# Patient Record
Sex: FEMALE | Race: OTHER | HISPANIC OR LATINO | URBAN - METROPOLITAN AREA
[De-identification: names, ages, dates, MRNs, and addresses within clinical notes are randomized per-mention and may not be internally consistent; named-entity substitution may affect disease eponyms.]

---

## 2019-03-21 ENCOUNTER — EMERGENCY (EMERGENCY)
Facility: HOSPITAL | Age: 44
LOS: 0 days | Discharge: HOME | End: 2019-03-21
Admitting: PHYSICIAN ASSISTANT

## 2019-03-21 VITALS
TEMPERATURE: 97 F | HEART RATE: 92 BPM | RESPIRATION RATE: 18 BRPM | OXYGEN SATURATION: 100 % | DIASTOLIC BLOOD PRESSURE: 66 MMHG | SYSTOLIC BLOOD PRESSURE: 116 MMHG

## 2019-03-21 DIAGNOSIS — N61.1 ABSCESS OF THE BREAST AND NIPPLE: ICD-10-CM

## 2019-03-21 DIAGNOSIS — N64.4 MASTODYNIA: ICD-10-CM

## 2019-03-21 DIAGNOSIS — N61.0 MASTITIS WITHOUT ABSCESS: ICD-10-CM

## 2019-03-21 DIAGNOSIS — Z79.2 LONG TERM (CURRENT) USE OF ANTIBIOTICS: ICD-10-CM

## 2019-03-21 NOTE — ED PROVIDER NOTE - SKIN, MLM
Breast Exam chaperoned by NATANAEL Aguilar ; Left Breast: 2 x 2 cm superficial area of fluctuance along 12 o clock region of breast abutting areola with some surrounding erythema ; no warmth ; no nipple involvement ; no nipple discharge ; remainder of visualized skin normal color for race, warm, dry and intact.

## 2019-03-21 NOTE — ED PROVIDER NOTE - NSFOLLOWUPINSTRUCTIONS_ED_ALL_ED_FT
Follow up in Breast Clinic ; Return to the ED in 1-2 days for a wound check  See information sheet(s) for further discharge instructions

## 2019-03-21 NOTE — ED PROVIDER NOTE - OBJECTIVE STATEMENT
42 y/o F, no significant PMHx, presents to the ED with complaints of left breast pain x three days. Patient admits to having redness along left breast with small area of swelling. She admits to a hx of prior similar symptoms approximately two months ago for which she sought tx at [facility]. She states that she was placed on abx with resolution of symptoms. She denies any nipple drainage, fever, chills, chest pain, dyspnea nausea and vomiting.

## 2019-03-21 NOTE — ED ADULT NURSE NOTE - CHIEF COMPLAINT QUOTE
as per son, "shes been having problems on her breast lately, she has pain and feels bumps" x2 months. Pt was seen at [facility] and told she had an infection and took a course of abx

## 2019-03-21 NOTE — ED PROVIDER NOTE - HEME/LYMPH NEGATIVE STATEMENT, MLM
[facility] [employer] of Child Health Examination       Student's Name  Jarrod Mcqueen D Date     Signature                                                                                                                                              Title                           Date    (If adding dates to the above immu ALLERGIES  (Food, drug, insect, other)  Patient has no known allergies. MEDICATION  (List all prescribed or taken on a regular basis.)     Diagnosis of asthma?   Child wakes during the night coughing   Yes   No    Yes   No    Loss of function of one of pair Family History   NO    Ethnic Minority  YES          Signs of Insulin Resistance (hypertension, dyslipidemia, polycystic ovarian syndrome, acanthosis nigricans)    NO           At Risk  NO   Lead Risk Questionnaire  Req'd for children 6 months thru 6 yrs e NEEDS/MODIFICATIONS required in the school setting  NONE DIETARY Needs/Restrictions     NONE   SPECIAL INSTRUCTIONS/DEVICES e.g. safety glasses, glass eye, chest protector for arrhythmia, pacemaker, prosthetic device, dental bridge, false teeth, athleticsu no anemia, no easy bruising, no jaundice, no swollen lymph nodes.

## 2019-03-21 NOTE — ED ADULT TRIAGE NOTE - CHIEF COMPLAINT QUOTE
as per son, "shes been having problems on her breast lately, she has pain and feels bumps" x2 months. Pt was seen at [facility] and told she had an infection and took a course of abx

## 2019-03-21 NOTE — ED PROVIDER NOTE - NSFOLLOWUPCLINICS_GEN_ALL_ED_FT
[facility]  Breast  [address]  [place] [address]  Phone: [phone]  Fax:   Follow Up Time:

## 2019-03-21 NOTE — ED PROVIDER NOTE - CLINICAL SUMMARY MEDICAL DECISION MAKING FREE TEXT BOX
Patient with superficial breast abscess - small incision made with expression of discharge. Patient prescribed abx and advised to return for a wound check in 1-2 days. I have discussed the discharge plan with the patient. The patient agrees with the plan, as discussed.  The patient understands Emergency Department diagnosis is a preliminary diagnosis often based on limited information and that the patient must adhere to the follow-up plan as discussed.  The patient understands that if the symptoms worsen or if prescribed medications do not have the desired/planned effect that the patient may return to the Emergency Department at any time for further evaluation and treatment.

## 2019-03-21 NOTE — ED PROVIDER NOTE - PROGRESS NOTE DETAILS
Patient offered translation services multiple times however she refused and requested that her son translate

## 2022-05-18 ENCOUNTER — OUTPATIENT (OUTPATIENT)
Dept: OUTPATIENT SERVICES | Facility: HOSPITAL | Age: 47
LOS: 1 days | Discharge: HOME | End: 2022-05-18

## 2022-05-18 DIAGNOSIS — Z20.828 CONTACT WITH AND (SUSPECTED) EXPOSURE TO OTHER VIRAL COMMUNICABLE DISEASES: ICD-10-CM

## 2024-04-19 ENCOUNTER — EMERGENCY (EMERGENCY)
Facility: HOSPITAL | Age: 49
LOS: 0 days | Discharge: HOME | End: 2024-04-20
Attending: EMERGENCY MEDICINE
Payer: SELF-PAY

## 2024-04-19 VITALS
HEART RATE: 102 BPM | DIASTOLIC BLOOD PRESSURE: 80 MMHG | TEMPERATURE: 99 F | SYSTOLIC BLOOD PRESSURE: 146 MMHG | HEIGHT: 56 IN | RESPIRATION RATE: 24 BRPM | OXYGEN SATURATION: 98 % | WEIGHT: 142.2 LBS

## 2024-04-19 DIAGNOSIS — Z20.822 CONTACT WITH AND (SUSPECTED) EXPOSURE TO COVID-19: ICD-10-CM

## 2024-04-19 DIAGNOSIS — R06.2 WHEEZING: ICD-10-CM

## 2024-04-19 DIAGNOSIS — R05.9 COUGH, UNSPECIFIED: ICD-10-CM

## 2024-04-19 DIAGNOSIS — R06.02 SHORTNESS OF BREATH: ICD-10-CM

## 2024-04-19 DIAGNOSIS — R91.8 OTHER NONSPECIFIC ABNORMAL FINDING OF LUNG FIELD: ICD-10-CM

## 2024-04-19 PROCEDURE — 85025 COMPLETE CBC W/AUTO DIFF WBC: CPT

## 2024-04-19 PROCEDURE — 71046 X-RAY EXAM CHEST 2 VIEWS: CPT

## 2024-04-19 PROCEDURE — 84703 CHORIONIC GONADOTROPIN ASSAY: CPT

## 2024-04-19 PROCEDURE — 71275 CT ANGIOGRAPHY CHEST: CPT | Mod: MC

## 2024-04-19 PROCEDURE — 36415 COLL VENOUS BLD VENIPUNCTURE: CPT

## 2024-04-19 PROCEDURE — 99285 EMERGENCY DEPT VISIT HI MDM: CPT

## 2024-04-19 PROCEDURE — 80053 COMPREHEN METABOLIC PANEL: CPT

## 2024-04-19 PROCEDURE — 99285 EMERGENCY DEPT VISIT HI MDM: CPT | Mod: 25

## 2024-04-19 PROCEDURE — 93010 ELECTROCARDIOGRAM REPORT: CPT

## 2024-04-19 PROCEDURE — 93005 ELECTROCARDIOGRAM TRACING: CPT

## 2024-04-19 PROCEDURE — 99053 MED SERV 10PM-8AM 24 HR FAC: CPT

## 2024-04-19 PROCEDURE — 85379 FIBRIN DEGRADATION QUANT: CPT

## 2024-04-19 PROCEDURE — 84484 ASSAY OF TROPONIN QUANT: CPT

## 2024-04-19 PROCEDURE — 96374 THER/PROPH/DIAG INJ IV PUSH: CPT | Mod: XU

## 2024-04-19 PROCEDURE — 83735 ASSAY OF MAGNESIUM: CPT

## 2024-04-19 PROCEDURE — 94640 AIRWAY INHALATION TREATMENT: CPT

## 2024-04-19 PROCEDURE — 0241U: CPT

## 2024-04-19 PROCEDURE — 83880 ASSAY OF NATRIURETIC PEPTIDE: CPT

## 2024-04-19 NOTE — ED ADULT TRIAGE NOTE - CHIEF COMPLAINT QUOTE
I got a lot of cough and I can't breathe for two weeks - patient  Patient denies fever, denies any sick contacts at home, has some chest discomfort when coughing, denies PMH

## 2024-04-20 VITALS — HEART RATE: 89 BPM | OXYGEN SATURATION: 97 % | RESPIRATION RATE: 20 BRPM

## 2024-04-20 LAB
ALBUMIN SERPL ELPH-MCNC: 4.4 G/DL — SIGNIFICANT CHANGE UP (ref 3.5–5.2)
ALP SERPL-CCNC: 90 U/L — SIGNIFICANT CHANGE UP (ref 30–115)
ALT FLD-CCNC: 17 U/L — SIGNIFICANT CHANGE UP (ref 0–41)
ANION GAP SERPL CALC-SCNC: 13 MMOL/L — SIGNIFICANT CHANGE UP (ref 7–14)
AST SERPL-CCNC: 23 U/L — SIGNIFICANT CHANGE UP (ref 0–41)
BASOPHILS # BLD AUTO: 0.07 K/UL — SIGNIFICANT CHANGE UP (ref 0–0.2)
BASOPHILS NFR BLD AUTO: 0.7 % — SIGNIFICANT CHANGE UP (ref 0–1)
BILIRUB SERPL-MCNC: 0.3 MG/DL — SIGNIFICANT CHANGE UP (ref 0.2–1.2)
BUN SERPL-MCNC: 13 MG/DL — SIGNIFICANT CHANGE UP (ref 10–20)
CALCIUM SERPL-MCNC: 9.1 MG/DL — SIGNIFICANT CHANGE UP (ref 8.4–10.5)
CHLORIDE SERPL-SCNC: 103 MMOL/L — SIGNIFICANT CHANGE UP (ref 98–110)
CO2 SERPL-SCNC: 21 MMOL/L — SIGNIFICANT CHANGE UP (ref 17–32)
CREAT SERPL-MCNC: 0.8 MG/DL — SIGNIFICANT CHANGE UP (ref 0.7–1.5)
D DIMER BLD IA.RAPID-MCNC: 195 NG/ML DDU — SIGNIFICANT CHANGE UP
EGFR: 92 ML/MIN/1.73M2 — SIGNIFICANT CHANGE UP
EOSINOPHIL # BLD AUTO: 1.16 K/UL — HIGH (ref 0–0.7)
EOSINOPHIL NFR BLD AUTO: 11.3 % — HIGH (ref 0–8)
FLUAV AG NPH QL: SIGNIFICANT CHANGE UP
FLUBV AG NPH QL: SIGNIFICANT CHANGE UP
GLUCOSE SERPL-MCNC: 111 MG/DL — HIGH (ref 70–99)
HCG SERPL QL: NEGATIVE — SIGNIFICANT CHANGE UP
HCT VFR BLD CALC: 36.7 % — LOW (ref 37–47)
HGB BLD-MCNC: 12.4 G/DL — SIGNIFICANT CHANGE UP (ref 12–16)
IMM GRANULOCYTES NFR BLD AUTO: 0.4 % — HIGH (ref 0.1–0.3)
LYMPHOCYTES # BLD AUTO: 2.74 K/UL — SIGNIFICANT CHANGE UP (ref 1.2–3.4)
LYMPHOCYTES # BLD AUTO: 26.8 % — SIGNIFICANT CHANGE UP (ref 20.5–51.1)
MAGNESIUM SERPL-MCNC: 2.1 MG/DL — SIGNIFICANT CHANGE UP (ref 1.8–2.4)
MCHC RBC-ENTMCNC: 28.2 PG — SIGNIFICANT CHANGE UP (ref 27–31)
MCHC RBC-ENTMCNC: 33.8 G/DL — SIGNIFICANT CHANGE UP (ref 32–37)
MCV RBC AUTO: 83.6 FL — SIGNIFICANT CHANGE UP (ref 81–99)
MONOCYTES # BLD AUTO: 0.97 K/UL — HIGH (ref 0.1–0.6)
MONOCYTES NFR BLD AUTO: 9.5 % — HIGH (ref 1.7–9.3)
NEUTROPHILS # BLD AUTO: 5.26 K/UL — SIGNIFICANT CHANGE UP (ref 1.4–6.5)
NEUTROPHILS NFR BLD AUTO: 51.3 % — SIGNIFICANT CHANGE UP (ref 42.2–75.2)
NRBC # BLD: 0 /100 WBCS — SIGNIFICANT CHANGE UP (ref 0–0)
NT-PROBNP SERPL-SCNC: <5 PG/ML — SIGNIFICANT CHANGE UP (ref 0–300)
PLATELET # BLD AUTO: 458 K/UL — HIGH (ref 130–400)
PMV BLD: 9.8 FL — SIGNIFICANT CHANGE UP (ref 7.4–10.4)
POTASSIUM SERPL-MCNC: 4 MMOL/L — SIGNIFICANT CHANGE UP (ref 3.5–5)
POTASSIUM SERPL-SCNC: 4 MMOL/L — SIGNIFICANT CHANGE UP (ref 3.5–5)
PROT SERPL-MCNC: 7.4 G/DL — SIGNIFICANT CHANGE UP (ref 6–8)
RBC # BLD: 4.39 M/UL — SIGNIFICANT CHANGE UP (ref 4.2–5.4)
RBC # FLD: 13.7 % — SIGNIFICANT CHANGE UP (ref 11.5–14.5)
RSV RNA NPH QL NAA+NON-PROBE: SIGNIFICANT CHANGE UP
SARS-COV-2 RNA SPEC QL NAA+PROBE: SIGNIFICANT CHANGE UP
SODIUM SERPL-SCNC: 137 MMOL/L — SIGNIFICANT CHANGE UP (ref 135–146)
TROPONIN T, HIGH SENSITIVITY RESULT: <6 NG/L — SIGNIFICANT CHANGE UP (ref 6–13)
WBC # BLD: 10.24 K/UL — SIGNIFICANT CHANGE UP (ref 4.8–10.8)
WBC # FLD AUTO: 10.24 K/UL — SIGNIFICANT CHANGE UP (ref 4.8–10.8)

## 2024-04-20 PROCEDURE — 71046 X-RAY EXAM CHEST 2 VIEWS: CPT | Mod: 26

## 2024-04-20 PROCEDURE — 71275 CT ANGIOGRAPHY CHEST: CPT | Mod: 26,MC

## 2024-04-20 RX ORDER — IPRATROPIUM/ALBUTEROL SULFATE 18-103MCG
3 AEROSOL WITH ADAPTER (GRAM) INHALATION ONCE
Refills: 0 | Status: COMPLETED | OUTPATIENT
Start: 2024-04-20 | End: 2024-04-20

## 2024-04-20 RX ORDER — ALBUTEROL 90 UG/1
1 AEROSOL, METERED ORAL ONCE
Refills: 0 | Status: COMPLETED | OUTPATIENT
Start: 2024-04-20 | End: 2024-04-20

## 2024-04-20 RX ADMIN — Medication 125 MILLIGRAM(S): at 01:07

## 2024-04-20 RX ADMIN — ALBUTEROL 1 PUFF(S): 90 AEROSOL, METERED ORAL at 06:41

## 2024-04-20 RX ADMIN — Medication 3 MILLILITER(S): at 01:06

## 2024-04-20 RX ADMIN — Medication 100 MILLIGRAM(S): at 06:39

## 2024-04-20 NOTE — ED PROVIDER NOTE - CARE PROVIDER_API CALL
Gabriel Reynoso  Critical Care Medicine  [address] [place] [id number]  Phone: [phone]  Fax: [phone]  Follow Up Time: 7-10 Days

## 2024-04-20 NOTE — ED ADULT NURSE NOTE - OBJECTIVE STATEMENT
Pt is c/o difficulties breathing, with cold symptoms for few days now, denies fever, denies any sick contacts at home, has some chest discomfort when coughing, denies PMH

## 2024-04-20 NOTE — ED PROVIDER NOTE - CLINICAL SUMMARY MEDICAL DECISION MAKING FREE TEXT BOX
Patient presented for evaluation of cough and, wheezing associated shortness of breath for the past year intermittently.  On exam patient with expiratory wheezes.  Required EKG, labs, imaging and labs.  CT of the chest done and showed bilateral upper lobe ground-glass opacities.  I spoke with radiologist regarding the likelihood of tuberculosis and states this i is not radiographically suspected.  Will treat patient with antibiotics and steroid and give patient outpatient follow-up with pulmonary.  Discussed with patient return precautions.

## 2024-04-20 NOTE — ED PROVIDER NOTE - PHYSICAL EXAMINATION
CONSTITUTIONAL: Well-developed; well-nourished; in no acute distress, nontoxic appearing  SKIN: skin exam is warm and dry,  HEAD: Normocephalic; atraumatic.  EYES: PERRL, 3 mm bilateral, no nystagmus, EOM intact; conjunctiva and sclera clear.  ENT: MMM, no nasal congestion  NECK: Supple; non tender.+ full passive ROM in all directions. No JVD  CARD: S1, S2 normal, no murmur  RESP: + expiratory wheezes, Good air movement bilaterally, normal work of breathing  ABD: soft; non-distended; non-tender. No Rebound, No guarding  EXT: Normal ROM. No cyanosis or edema. Dp Pulses intact.   NEURO: awake, alert, following commands, oriented, grossly unremarkable. No Focal deficits. GCS 15.   PSYCH: Cooperative, appropriate.

## 2024-04-20 NOTE — ED PROVIDER NOTE - OBJECTIVE STATEMENT
48-year-old female otherwise healthy, non-smoker presents for evaluation of over 1 year of intermittent episodes of coughing and associated wheezing and shortness of breath.  Patient denies any chest pain, no LOC.  Patient states that she has been to the urgent care and was treated with antibiotics and steroids and her symptoms are improving every time.  Denies fever, chest pain, abdominal or urinary complaints.

## 2024-04-20 NOTE — ED PROVIDER NOTE - PATIENT PORTAL LINK FT
You can access the FollowMyHealth Patient Portal offered by [facility] by registering at the following website: [email or web address]. By joining [employer]’s FollowMyHealth portal, you will also be able to view your health information using other applications (apps) compatible with our system.

## 2024-04-20 NOTE — ED PROVIDER NOTE - NSFOLLOWUPINSTRUCTIONS_ED_ALL_ED_FT
Follow up with the pulmonologist within 1 week    Nuestros coordinadores de referencias del departamento de emergencias se comunicarán con usted en las próximas 24 a  48 horas de 9:00 a. m. a 5:00 p. m. (de lunes a viernes) con frank radha de seguimiento. Espere frank llamada telefónica del hospital en femi período de tiempo. Si no recibe frank llamada o si tiene alguna pregunta o inquietud, puede comunicarse con ellos al [phone].    Disnea en los adultos  Shortness of Breath, Adult  Frank persona tiene disnea cuando tiene dificultad para respirar o cuando siente que tiene problemas para inhalar suficiente aire. La disnea puede ser un signo de un problema médico.    Siga estas indicaciones en martinez casa:  A sign showing that a person should not smoke.  Sustancias contaminantes    No consuma ningún producto que contenga nicotina o tabaco. Estos productos incluyen cigarrillos, tabaco para mascar y aparatos de vapeo, alessandra los cigarrillos electrónicos. [place] también incluye cigarros y pipas. Si necesita ayuda para dejar de consumir estos productos, consulte al médico.  Evite cosas que pueden irritar las vías respiratorias, entre ellas:  Humo. [place] incluye el humo de las fogatas, el humo de los incendios forestales y el humo ambiental de los productos que contienen tabaco. No fume ni permita que otras personas fumen en martinez casa.  Moho.  Polvo.  Contaminación del aire.  Vapores de productos químicos.  Cosas que le pueden producir frank reacción alérgica (alérgenos) si tiene alergias. Los alérgenos frecuentes incluyen el polen de pasto o árboles y la caspa de los animales.  Mantenga martinez casa limpia y sin moho ni polvo.  Indicaciones generales    Esté atento a cualquier cambio en los síntomas.  Use los medicamentos de venta raghav y los recetados solamente alessandra se lo haya indicado el médico. [place] incluye la oxigenoterapia y los medicamentos inhalados.  Descanse todo lo que sea necesario.  Retome brandon actividades normales según lo indicado por el médico. Pregúntele al médico qué actividades son seguras para usted.  Concurra a todas las visitas de seguimiento. [place] es importante.  Comuníquese con un médico si:  Martinez afección no mejora tan pronto alessandra se espera.  Le nasreen hacer las actividades cotidianas, incluso después de descansar.  Aparecen nuevos síntomas.  No puede subir escaleras o realizar ejercicio del modo en que lo hacía habitualmente.  Solicite ayuda de inmediato si:  La disnea empeora.  Tiene dificultad para respirar cuando está en reposo.  Se siente mareado o se desmaya.  Tiene tos que no puede controlar con la medicación.  Tose y escupe kylee.  Siente dolor al respirar.  Tiene dolor en el pecho, los brazos, los hombros o el abdomen.  Tiene fiebre.  Estos síntomas pueden indicar frank emergencia. Solicite ayuda de inmediato. Llame al 911.  No espere a nani si los síntomas desaparecen.  No conduzca por brandon propios medios hasta el hospital.  Resumen  Frank persona tiene disnea cuando tiene dificultad para inhalar la cantidad suficiente de aire. Puede ser signo de un problema médico.  Evite las cosas que irritan los pulmones, alessandra el hábito de fumar, la contaminación, el moho y el polvo.  Preste atención a los cambios en los síntomas y comuníquese con el médico si le resulta difícil realizar brandon actividades cotidianas debido a la disnea.  Esta información no tiene alessandra fin reemplazar el consejo del médico. Asegúrese de hacerle al médico cualquier pregunta que tenga.

## 2024-06-03 ENCOUNTER — EMERGENCY (EMERGENCY)
Facility: HOSPITAL | Age: 49
LOS: 0 days | Discharge: HOME | End: 2024-06-03
Attending: EMERGENCY MEDICINE
Payer: COMMERCIAL

## 2024-06-03 VITALS
DIASTOLIC BLOOD PRESSURE: 63 MMHG | WEIGHT: 160.06 LBS | TEMPERATURE: 98 F | RESPIRATION RATE: 18 BRPM | HEIGHT: 56 IN | SYSTOLIC BLOOD PRESSURE: 112 MMHG | OXYGEN SATURATION: 100 % | HEART RATE: 92 BPM

## 2024-06-03 DIAGNOSIS — R10.13 EPIGASTRIC PAIN: ICD-10-CM

## 2024-06-03 DIAGNOSIS — R53.1 WEAKNESS: ICD-10-CM

## 2024-06-03 DIAGNOSIS — E86.0 DEHYDRATION: ICD-10-CM

## 2024-06-03 DIAGNOSIS — R11.0 NAUSEA: ICD-10-CM

## 2024-06-03 DIAGNOSIS — R19.7 DIARRHEA, UNSPECIFIED: ICD-10-CM

## 2024-06-03 DIAGNOSIS — R53.83 OTHER FATIGUE: ICD-10-CM

## 2024-06-03 DIAGNOSIS — E73.9 LACTOSE INTOLERANCE, UNSPECIFIED: ICD-10-CM

## 2024-06-03 DIAGNOSIS — Z98.891 HISTORY OF UTERINE SCAR FROM PREVIOUS SURGERY: ICD-10-CM

## 2024-06-03 LAB
ALBUMIN SERPL ELPH-MCNC: 4.1 G/DL — SIGNIFICANT CHANGE UP (ref 3.5–5.2)
ALP SERPL-CCNC: 85 U/L — SIGNIFICANT CHANGE UP (ref 30–115)
ALT FLD-CCNC: 13 U/L — SIGNIFICANT CHANGE UP (ref 0–41)
ANION GAP SERPL CALC-SCNC: 12 MMOL/L — SIGNIFICANT CHANGE UP (ref 7–14)
AST SERPL-CCNC: 17 U/L — SIGNIFICANT CHANGE UP (ref 0–41)
BASOPHILS # BLD AUTO: 0.03 K/UL — SIGNIFICANT CHANGE UP (ref 0–0.2)
BASOPHILS NFR BLD AUTO: 0.3 % — SIGNIFICANT CHANGE UP (ref 0–1)
BILIRUB SERPL-MCNC: <0.2 MG/DL — SIGNIFICANT CHANGE UP (ref 0.2–1.2)
BUN SERPL-MCNC: 14 MG/DL — SIGNIFICANT CHANGE UP (ref 10–20)
CALCIUM SERPL-MCNC: 8.8 MG/DL — SIGNIFICANT CHANGE UP (ref 8.4–10.5)
CHLORIDE SERPL-SCNC: 106 MMOL/L — SIGNIFICANT CHANGE UP (ref 98–110)
CO2 SERPL-SCNC: 21 MMOL/L — SIGNIFICANT CHANGE UP (ref 17–32)
CREAT SERPL-MCNC: 0.7 MG/DL — SIGNIFICANT CHANGE UP (ref 0.7–1.5)
EGFR: 108 ML/MIN/1.73M2 — SIGNIFICANT CHANGE UP
EOSINOPHIL # BLD AUTO: 0.83 K/UL — HIGH (ref 0–0.7)
EOSINOPHIL NFR BLD AUTO: 7.5 % — SIGNIFICANT CHANGE UP (ref 0–8)
GLUCOSE SERPL-MCNC: 123 MG/DL — HIGH (ref 70–99)
HCG SERPL QL: NEGATIVE — SIGNIFICANT CHANGE UP
HCT VFR BLD CALC: 36.1 % — LOW (ref 37–47)
HGB BLD-MCNC: 11.9 G/DL — LOW (ref 12–16)
IMM GRANULOCYTES NFR BLD AUTO: 0.4 % — HIGH (ref 0.1–0.3)
LYMPHOCYTES # BLD AUTO: 19.5 % — LOW (ref 20.5–51.1)
LYMPHOCYTES # BLD AUTO: 2.15 K/UL — SIGNIFICANT CHANGE UP (ref 1.2–3.4)
MCHC RBC-ENTMCNC: 28.7 PG — SIGNIFICANT CHANGE UP (ref 27–31)
MCHC RBC-ENTMCNC: 33 G/DL — SIGNIFICANT CHANGE UP (ref 32–37)
MCV RBC AUTO: 87.2 FL — SIGNIFICANT CHANGE UP (ref 81–99)
MONOCYTES # BLD AUTO: 0.55 K/UL — SIGNIFICANT CHANGE UP (ref 0.1–0.6)
MONOCYTES NFR BLD AUTO: 5 % — SIGNIFICANT CHANGE UP (ref 1.7–9.3)
NEUTROPHILS # BLD AUTO: 7.43 K/UL — HIGH (ref 1.4–6.5)
NEUTROPHILS NFR BLD AUTO: 67.3 % — SIGNIFICANT CHANGE UP (ref 42.2–75.2)
NRBC # BLD: 0 /100 WBCS — SIGNIFICANT CHANGE UP (ref 0–0)
PLATELET # BLD AUTO: 368 K/UL — SIGNIFICANT CHANGE UP (ref 130–400)
PMV BLD: 10.4 FL — SIGNIFICANT CHANGE UP (ref 7.4–10.4)
POTASSIUM SERPL-MCNC: 4.2 MMOL/L — SIGNIFICANT CHANGE UP (ref 3.5–5)
POTASSIUM SERPL-SCNC: 4.2 MMOL/L — SIGNIFICANT CHANGE UP (ref 3.5–5)
PROT SERPL-MCNC: 6.2 G/DL — SIGNIFICANT CHANGE UP (ref 6–8)
RBC # BLD: 4.14 M/UL — LOW (ref 4.2–5.4)
RBC # FLD: 14.5 % — SIGNIFICANT CHANGE UP (ref 11.5–14.5)
SODIUM SERPL-SCNC: 139 MMOL/L — SIGNIFICANT CHANGE UP (ref 135–146)
WBC # BLD: 11.03 K/UL — HIGH (ref 4.8–10.8)
WBC # FLD AUTO: 11.03 K/UL — HIGH (ref 4.8–10.8)

## 2024-06-03 PROCEDURE — 96374 THER/PROPH/DIAG INJ IV PUSH: CPT

## 2024-06-03 PROCEDURE — 93005 ELECTROCARDIOGRAM TRACING: CPT

## 2024-06-03 PROCEDURE — 36415 COLL VENOUS BLD VENIPUNCTURE: CPT

## 2024-06-03 PROCEDURE — 80053 COMPREHEN METABOLIC PANEL: CPT

## 2024-06-03 PROCEDURE — 84703 CHORIONIC GONADOTROPIN ASSAY: CPT

## 2024-06-03 PROCEDURE — 85025 COMPLETE CBC W/AUTO DIFF WBC: CPT

## 2024-06-03 PROCEDURE — 99284 EMERGENCY DEPT VISIT MOD MDM: CPT

## 2024-06-03 PROCEDURE — 93010 ELECTROCARDIOGRAM REPORT: CPT

## 2024-06-03 PROCEDURE — T1013: CPT

## 2024-06-03 PROCEDURE — 99284 EMERGENCY DEPT VISIT MOD MDM: CPT | Mod: 25

## 2024-06-03 RX ORDER — SODIUM CHLORIDE 9 MG/ML
2000 INJECTION INTRAMUSCULAR; INTRAVENOUS; SUBCUTANEOUS ONCE
Refills: 0 | Status: COMPLETED | OUTPATIENT
Start: 2024-06-03 | End: 2024-06-03

## 2024-06-03 RX ORDER — FAMOTIDINE 10 MG/ML
20 INJECTION INTRAVENOUS ONCE
Refills: 0 | Status: COMPLETED | OUTPATIENT
Start: 2024-06-03 | End: 2024-06-03

## 2024-06-03 RX ADMIN — FAMOTIDINE 20 MILLIGRAM(S): 10 INJECTION INTRAVENOUS at 21:20

## 2024-06-03 RX ADMIN — SODIUM CHLORIDE 2000 MILLILITER(S): 9 INJECTION INTRAMUSCULAR; INTRAVENOUS; SUBCUTANEOUS at 20:10

## 2024-06-03 NOTE — ED ADULT NURSE NOTE - CHIEF COMPLAINT QUOTE
pt c/o abdominal pain, nausea and diarrhea that began when she woke up this morning. pt also c/o weakness, dizziness and diaphoresis

## 2024-06-03 NOTE — ED ADULT TRIAGE NOTE - CHIEF COMPLAINT QUOTE
pt c/o abdominal pain, nausea and diarrhea that began when she woke up this morning. pt also c/o weakness, dizziness and diaphoresis Bipolar disorder

## 2024-06-03 NOTE — ED PROVIDER NOTE - PATIENT PORTAL LINK FT
You can access the FollowMyHealth Patient Portal offered by [facility] by registering at the following website: [email or web address]. By joining [employer]’s FollowMyHealth portal, you will also be able to view your health information using other applications (apps) compatible with our system.

## 2024-06-03 NOTE — ED PROVIDER NOTE - ATTENDING APP SHARED VISIT CONTRIBUTION OF CARE
49 yo F Citizen of the [place] speaking patient with PMH of  x 1, lactose intolerance, presents to the ER for diarrhea x few (watery, nonbloody) since this am. Pt denies any vomiting but had nausea and epigastric discomfort this am which is now resolved. Pt states she ate some iced sorbet (nondairy) last night, but nothing else that could explain the diarrhea or upset stomach. No sick contacts or travel. Pt states at this point the nausea/diarrhea and epigastric discomfort all resolved but she feels a lot of fatigue and generalized weakness. No fever/chills/rash/HA/SOB/CP/ complaints/dizziness/neck pain/leg swelling or calf tenderness. Not eating much today but drinking slowly from a water bottle with no vomiting    AVSS pt in NAD, NCAT, PERRL, EOMI, Lungs: CTAB, Heart: Reg S1S2, Abdomen: soft nt/nd +BS no mass, Ext: no edema, no calf tenderness, normal distal pulses.     A/P Nontender abdomen now. States she had large volume of diarrhea which can sometimes cause low potassium. Will check labs, EKG, IVFs and reassess.     ALL: nkda  Meds: denies  SH no smoking or etoh  LMP 1 week ago, irregular

## 2024-06-03 NOTE — ED PROVIDER NOTE - NSFOLLOWUPINSTRUCTIONS_ED_ALL_ED_FT
Dehydration    WHAT YOU NEED TO KNOW:    Dehydration is a condition that develops when your body does not have enough fluid. You may become dehydrated if you do not drink enough water or lose too much fluid. Fluid loss may also cause loss of electrolytes (minerals), such as sodium.    DISCHARGE INSTRUCTIONS:    Return to the emergency department if:     You have a seizure.      You are confused or cannot think clearly.      You are extremely sleepy, or another person cannot wake you.       You become dizzy or faint when you stand.      You are not able to urinate.      You have trouble breathing.      You have a fast or irregular heartbeat.      Your hands or feet are cold, or your face is pale.     Contact your healthcare provider if:     You have trouble drinking liquids because you are vomiting.      Your symptoms get worse.      You have a fever.       You feel very weak or tired.      You have questions or concerns about your condition or care.    Follow up with your healthcare provider as directed: Write down your questions so you remember to ask them during your visits.     Prevent or manage dehydration:     Drink liquids as directed. Liquids that contain water, sugar, and minerals can help your body hold in fluid and help prevent dehydration. Drink liquids throughout the day, not just when you feel thirsty. Men should drink about 3 liters (13 eight-ounce cups) of liquid each day. Women should drink about 2 liters (9 eight-ounce cups) of liquid each day. Drink even more liquid if you will be outdoors, in the sun for a long time, or exercising.       Stay cool. Limit the time you spend outdoors during the hottest part of the day. Dress in lightweight clothes.       Keep track of how often you urinate. If you urinate less than usual or your urine is darker, drink more liquids.         © Copyright [employer] 2019 All illustrations and images included in CareNotes are the copyrighted property of [employer] or [employer].

## 2024-06-03 NOTE — ED PROVIDER NOTE - OBJECTIVE STATEMENT
48-year-old female Bangladeshi-speaking with no significant past medical history presents to the ED with generalized weakness.   ID [id number] used to obtain history.  Patient states that she woke up this morning with multiple episodes of large-volume watery diarrhea.  She also reported epigastric pain earlier in the day associated with nausea and 1 episode of clear emesis. At this time her abdominal pain, nausea, vomiting and diarrhea have resolved however she reports generalized weakness and fatigue. Denies headache, dizziness, paresthesias, fever, chills, chest pain, shortness of breath, constipation, dysuria, hematuria, lower extremity swelling, rash.

## 2024-06-03 NOTE — ED PROVIDER NOTE - CLINICAL SUMMARY MEDICAL DECISION MAKING FREE TEXT BOX
49 yo F Brazilian speaking patient with PMH of  x 1, lactose intolerance, presents to the ER for diarrhea x few (watery, nonbloody) since this am. Pt denies any vomiting but had nausea and epigastric discomfort this am which is now resolved. Pt states she ate some iced sorbet (nondairy) last night, but nothing else that could explain the diarrhea or upset stomach. No sick contacts or travel. Pt states at this point the nausea/diarrhea and epigastric discomfort all resolved but she feels a lot of fatigue and generalized weakness. No fever/chills/rash/HA/SOB/CP/ complaints/dizziness/neck pain/leg swelling or calf tenderness. Not eating much today but drinking slowly from a water bottle with no vomiting    Reviewed all test results . Pt given all results. Results explained to pt. Pt has serial abdominal exams, nontender. Tolerated po challenge in the ER. NO diarrhea in the ER. To dc as pt feels better. Recommend f/u with PMD. Return to ER for any worsening of symptoms

## 2024-06-03 NOTE — ED ADULT TRIAGE NOTE - NS ED TRIAGE AVPU SCALE
-- DO NOT REPLY / DO NOT REPLY ALL --  -- Message is from [employer] Center [employer] ([employer]) --    General Patient Message: Please reach out to Elly from [employer] at [phone] regarding the prrogress notes from patient's last visit.      Caller Information       Type Contact Phone/Fax    08/16/2023 10:39 AM CDT Phone (Outgoing) Francois Rocha (Self) [phone] (M)    Left Message     08/18/2023 09:54 AM CDT Phone (Incoming) Elly/[employer] [phone]        Alternative phone number: None    Can a detailed message be left? Yes    Message Turnaround:     Is it Working Hours? Yes - Working Hours     [place]:    Please give this turnaround time to the caller:   \"This message will be sent to [state Provider's name]. The clinical team will fulfill your request as soon as they review your message.\"                 Alert-The patient is alert, awake and responds to voice. The patient is oriented to time, place, and person. The triage nurse is able to obtain subjective information.

## 2024-08-31 ENCOUNTER — EMERGENCY (EMERGENCY)
Facility: HOSPITAL | Age: 49
LOS: 0 days | Discharge: HOME | End: 2024-08-31
Attending: EMERGENCY MEDICINE
Payer: COMMERCIAL

## 2024-08-31 VITALS
TEMPERATURE: 98 F | RESPIRATION RATE: 18 BRPM | SYSTOLIC BLOOD PRESSURE: 118 MMHG | HEIGHT: 58 IN | DIASTOLIC BLOOD PRESSURE: 74 MMHG | WEIGHT: 135.8 LBS | HEART RATE: 92 BPM | OXYGEN SATURATION: 98 %

## 2024-08-31 DIAGNOSIS — R05.1 ACUTE COUGH: ICD-10-CM

## 2024-08-31 DIAGNOSIS — R06.2 WHEEZING: ICD-10-CM

## 2024-08-31 DIAGNOSIS — R09.81 NASAL CONGESTION: ICD-10-CM

## 2024-08-31 DIAGNOSIS — J45.909 UNSPECIFIED ASTHMA, UNCOMPLICATED: ICD-10-CM

## 2024-08-31 PROCEDURE — 99284 EMERGENCY DEPT VISIT MOD MDM: CPT

## 2024-08-31 PROCEDURE — 99285 EMERGENCY DEPT VISIT HI MDM: CPT | Mod: 25

## 2024-08-31 PROCEDURE — 94640 AIRWAY INHALATION TREATMENT: CPT

## 2024-08-31 PROCEDURE — 71046 X-RAY EXAM CHEST 2 VIEWS: CPT

## 2024-08-31 PROCEDURE — 71046 X-RAY EXAM CHEST 2 VIEWS: CPT | Mod: 26

## 2024-08-31 RX ORDER — PREDNISONE 20 MG/1
60 TABLET ORAL ONCE
Refills: 0 | Status: COMPLETED | OUTPATIENT
Start: 2024-08-31 | End: 2024-08-31

## 2024-08-31 RX ORDER — IPRATROPIUM BROMIDE AND ALBUTEROL SULFATE .5; 2.5 MG/3ML; MG/3ML
3 SOLUTION RESPIRATORY (INHALATION)
Refills: 0 | Status: COMPLETED | OUTPATIENT
Start: 2024-08-31 | End: 2024-08-31

## 2024-08-31 RX ORDER — AZITHROMYCIN 250 MG
1 CAPSULE ORAL
Qty: 6 | Refills: 0
Start: 2024-08-31 | End: 2024-09-04

## 2024-08-31 RX ORDER — PREDNISONE 20 MG/1
1 TABLET ORAL
Qty: 5 | Refills: 0
Start: 2024-08-31 | End: 2024-09-04

## 2024-08-31 RX ADMIN — IPRATROPIUM BROMIDE AND ALBUTEROL SULFATE 3 MILLILITER(S): .5; 2.5 SOLUTION RESPIRATORY (INHALATION) at 15:48

## 2024-08-31 RX ADMIN — IPRATROPIUM BROMIDE AND ALBUTEROL SULFATE 3 MILLILITER(S): .5; 2.5 SOLUTION RESPIRATORY (INHALATION) at 15:28

## 2024-08-31 RX ADMIN — PREDNISONE 60 MILLIGRAM(S): 20 TABLET ORAL at 15:26

## 2024-08-31 RX ADMIN — IPRATROPIUM BROMIDE AND ALBUTEROL SULFATE 3 MILLILITER(S): .5; 2.5 SOLUTION RESPIRATORY (INHALATION) at 16:08

## 2024-09-19 PROBLEM — Z00.00 ENCOUNTER FOR PREVENTIVE HEALTH EXAMINATION: Status: ACTIVE | Noted: 2024-09-19

## 2024-10-17 ENCOUNTER — EMERGENCY (EMERGENCY)
Facility: HOSPITAL | Age: 49
LOS: 0 days | Discharge: HOME | End: 2024-10-18
Attending: EMERGENCY MEDICINE
Payer: SELF-PAY

## 2024-10-17 VITALS
TEMPERATURE: 98 F | OXYGEN SATURATION: 96 % | RESPIRATION RATE: 20 BRPM | SYSTOLIC BLOOD PRESSURE: 114 MMHG | HEART RATE: 106 BPM | HEIGHT: 58 IN | DIASTOLIC BLOOD PRESSURE: 76 MMHG

## 2024-10-17 DIAGNOSIS — R07.89 OTHER CHEST PAIN: ICD-10-CM

## 2024-10-17 DIAGNOSIS — J40 BRONCHITIS, NOT SPECIFIED AS ACUTE OR CHRONIC: ICD-10-CM

## 2024-10-17 DIAGNOSIS — R05.8 OTHER SPECIFIED COUGH: ICD-10-CM

## 2024-10-17 DIAGNOSIS — J18.9 PNEUMONIA, UNSPECIFIED ORGANISM: ICD-10-CM

## 2024-10-17 DIAGNOSIS — R06.02 SHORTNESS OF BREATH: ICD-10-CM

## 2024-10-17 LAB
ALBUMIN SERPL ELPH-MCNC: 4.4 G/DL — SIGNIFICANT CHANGE UP (ref 3.5–5.2)
ALP SERPL-CCNC: 81 U/L — SIGNIFICANT CHANGE UP (ref 30–115)
ALT FLD-CCNC: 17 U/L — SIGNIFICANT CHANGE UP (ref 0–41)
ANION GAP SERPL CALC-SCNC: 12 MMOL/L — SIGNIFICANT CHANGE UP (ref 7–14)
AST SERPL-CCNC: 26 U/L — SIGNIFICANT CHANGE UP (ref 0–41)
BASOPHILS # BLD AUTO: 0.06 K/UL — SIGNIFICANT CHANGE UP (ref 0–0.2)
BASOPHILS NFR BLD AUTO: 0.7 % — SIGNIFICANT CHANGE UP (ref 0–1)
BILIRUB SERPL-MCNC: 0.2 MG/DL — SIGNIFICANT CHANGE UP (ref 0.2–1.2)
BUN SERPL-MCNC: 7 MG/DL — LOW (ref 10–20)
CALCIUM SERPL-MCNC: 9.4 MG/DL — SIGNIFICANT CHANGE UP (ref 8.4–10.5)
CHLORIDE SERPL-SCNC: 100 MMOL/L — SIGNIFICANT CHANGE UP (ref 98–110)
CO2 SERPL-SCNC: 25 MMOL/L — SIGNIFICANT CHANGE UP (ref 17–32)
CREAT SERPL-MCNC: 0.6 MG/DL — LOW (ref 0.7–1.5)
EGFR: 111 ML/MIN/1.73M2 — SIGNIFICANT CHANGE UP
EOSINOPHIL # BLD AUTO: 1.4 K/UL — HIGH (ref 0–0.7)
EOSINOPHIL NFR BLD AUTO: 16.5 % — HIGH (ref 0–8)
GLUCOSE SERPL-MCNC: 90 MG/DL — SIGNIFICANT CHANGE UP (ref 70–99)
HCG SERPL QL: NEGATIVE — SIGNIFICANT CHANGE UP
HCT VFR BLD CALC: 37.4 % — SIGNIFICANT CHANGE UP (ref 37–47)
HGB BLD-MCNC: 12.6 G/DL — SIGNIFICANT CHANGE UP (ref 12–16)
IMM GRANULOCYTES NFR BLD AUTO: 0.4 % — HIGH (ref 0.1–0.3)
LYMPHOCYTES # BLD AUTO: 2.03 K/UL — SIGNIFICANT CHANGE UP (ref 1.2–3.4)
LYMPHOCYTES # BLD AUTO: 24 % — SIGNIFICANT CHANGE UP (ref 20.5–51.1)
MCHC RBC-ENTMCNC: 29.2 PG — SIGNIFICANT CHANGE UP (ref 27–31)
MCHC RBC-ENTMCNC: 33.7 G/DL — SIGNIFICANT CHANGE UP (ref 32–37)
MCV RBC AUTO: 86.6 FL — SIGNIFICANT CHANGE UP (ref 81–99)
MONOCYTES # BLD AUTO: 0.68 K/UL — HIGH (ref 0.1–0.6)
MONOCYTES NFR BLD AUTO: 8 % — SIGNIFICANT CHANGE UP (ref 1.7–9.3)
NEUTROPHILS # BLD AUTO: 4.27 K/UL — SIGNIFICANT CHANGE UP (ref 1.4–6.5)
NEUTROPHILS NFR BLD AUTO: 50.4 % — SIGNIFICANT CHANGE UP (ref 42.2–75.2)
NRBC # BLD: 0 /100 WBCS — SIGNIFICANT CHANGE UP (ref 0–0)
PLATELET # BLD AUTO: 417 K/UL — HIGH (ref 130–400)
PMV BLD: 9.8 FL — SIGNIFICANT CHANGE UP (ref 7.4–10.4)
POTASSIUM SERPL-MCNC: 4.4 MMOL/L — SIGNIFICANT CHANGE UP (ref 3.5–5)
POTASSIUM SERPL-SCNC: 4.4 MMOL/L — SIGNIFICANT CHANGE UP (ref 3.5–5)
PROT SERPL-MCNC: 6.8 G/DL — SIGNIFICANT CHANGE UP (ref 6–8)
RBC # BLD: 4.32 M/UL — SIGNIFICANT CHANGE UP (ref 4.2–5.4)
RBC # FLD: 13.1 % — SIGNIFICANT CHANGE UP (ref 11.5–14.5)
SODIUM SERPL-SCNC: 137 MMOL/L — SIGNIFICANT CHANGE UP (ref 135–146)
WBC # BLD: 8.47 K/UL — SIGNIFICANT CHANGE UP (ref 4.8–10.8)
WBC # FLD AUTO: 8.47 K/UL — SIGNIFICANT CHANGE UP (ref 4.8–10.8)

## 2024-10-17 PROCEDURE — 71250 CT THORAX DX C-: CPT | Mod: MC

## 2024-10-17 PROCEDURE — 99285 EMERGENCY DEPT VISIT HI MDM: CPT | Mod: 25

## 2024-10-17 PROCEDURE — 80053 COMPREHEN METABOLIC PANEL: CPT

## 2024-10-17 PROCEDURE — 93010 ELECTROCARDIOGRAM REPORT: CPT

## 2024-10-17 PROCEDURE — 71046 X-RAY EXAM CHEST 2 VIEWS: CPT

## 2024-10-17 PROCEDURE — 93005 ELECTROCARDIOGRAM TRACING: CPT

## 2024-10-17 PROCEDURE — 94640 AIRWAY INHALATION TREATMENT: CPT

## 2024-10-17 PROCEDURE — 96374 THER/PROPH/DIAG INJ IV PUSH: CPT

## 2024-10-17 PROCEDURE — 71250 CT THORAX DX C-: CPT | Mod: 26,MC

## 2024-10-17 PROCEDURE — 93010 ELECTROCARDIOGRAM REPORT: CPT | Mod: 77

## 2024-10-17 PROCEDURE — 36415 COLL VENOUS BLD VENIPUNCTURE: CPT

## 2024-10-17 PROCEDURE — 71046 X-RAY EXAM CHEST 2 VIEWS: CPT | Mod: 26

## 2024-10-17 PROCEDURE — 84703 CHORIONIC GONADOTROPIN ASSAY: CPT

## 2024-10-17 PROCEDURE — 85025 COMPLETE CBC W/AUTO DIFF WBC: CPT

## 2024-10-17 PROCEDURE — 99285 EMERGENCY DEPT VISIT HI MDM: CPT

## 2024-10-17 RX ORDER — MAGNESIUM SULFATE 500 MG/ML
2 VIAL (ML) INJECTION ONCE
Refills: 0 | Status: COMPLETED | OUTPATIENT
Start: 2024-10-17 | End: 2024-10-17

## 2024-10-17 RX ORDER — ALBUTEROL 90 MCG
2 AEROSOL (GRAM) INHALATION EVERY 6 HOURS
Refills: 0 | Status: DISCONTINUED | OUTPATIENT
Start: 2024-10-17 | End: 2024-10-18

## 2024-10-17 RX ORDER — PREDNISONE 5 MG/1
1 TABLET ORAL
Qty: 4 | Refills: 0
Start: 2024-10-17 | End: 2024-10-20

## 2024-10-17 RX ORDER — IPRATROPIUM BROMIDE AND ALBUTEROL SULFATE .5; 3 MG/3ML; MG/3ML
3 SOLUTION RESPIRATORY (INHALATION)
Refills: 0 | Status: COMPLETED | OUTPATIENT
Start: 2024-10-17 | End: 2024-10-17

## 2024-10-17 RX ADMIN — IPRATROPIUM BROMIDE AND ALBUTEROL SULFATE 3 MILLILITER(S): .5; 3 SOLUTION RESPIRATORY (INHALATION) at 18:55

## 2024-10-17 RX ADMIN — IPRATROPIUM BROMIDE AND ALBUTEROL SULFATE 3 MILLILITER(S): .5; 3 SOLUTION RESPIRATORY (INHALATION) at 19:20

## 2024-10-17 RX ADMIN — Medication 150 GRAM(S): at 22:45

## 2024-10-17 RX ADMIN — Medication 2 PUFF(S): at 23:27

## 2024-10-17 RX ADMIN — IPRATROPIUM BROMIDE AND ALBUTEROL SULFATE 3 MILLILITER(S): .5; 3 SOLUTION RESPIRATORY (INHALATION) at 19:44

## 2024-10-17 RX ADMIN — IPRATROPIUM BROMIDE AND ALBUTEROL SULFATE 3 MILLILITER(S): .5; 3 SOLUTION RESPIRATORY (INHALATION) at 22:45

## 2024-10-17 RX ADMIN — IPRATROPIUM BROMIDE AND ALBUTEROL SULFATE 3 MILLILITER(S): .5; 3 SOLUTION RESPIRATORY (INHALATION) at 23:24

## 2024-10-17 RX ADMIN — Medication 10 MILLIGRAM(S): at 18:55

## 2024-10-17 NOTE — ED PROVIDER NOTE - PATIENT PORTAL LINK FT
You can access the FollowMyHealth Patient Portal offered by [facility] by registering at the following website: [email or web address]. By joining [employer]’s FollowMyHealth portal, you will also be able to view your health information using other applications (apps) compatible with our system.

## 2024-10-17 NOTE — ED PROVIDER NOTE - OBJECTIVE STATEMENT
49-year-old female no reported past medical history presents to the ED for evaluation of cough.  Patient with cough x 3 weeks, minimally productive, progressively worsening with associated shortness of breath and chest tightness when coughing.  Positive contacts at home son with viral illness.  Has used her son's nebulizer with interval improvement.  Denies any fevers, chills, nausea, vomiting, diarrhea, hemoptysis, leg swelling, typical chest pain, history of clots in legs along, hormone use, recent surgeries, recent travel

## 2024-10-17 NOTE — ED PROVIDER NOTE - NSPTACCESSSVCSAPPTDETAILS_ED_ALL_ED_FT
Patient with chronic cough for 3 weeks and wheezing, given steroids and albuterol inhaler.  Does not have PCP please help establish care.  Patient also needs routine follow-up with pulmonology to establish questionable diagnosis of asthma Patient with chronic cough for 3 weeks and wheezing,  questionable pneumonia on CT scan given steroids and albuterol inhaler.  Does not have PCP please help establish care . Patient also needs routine follow-up with pulmonology to establish questionable diagnosis of asthma

## 2024-10-17 NOTE — ED PROVIDER NOTE - PHYSICAL EXAMINATION
CONST: Well appearing in NAD  EYES: PERRL, EOMI, Sclera and conjunctiva clear.   ENT: Oropharynx normal appearing, no erythema or exudates. Uvula midline.  CARD: Normal S1 S2; Normal rate and rhythm  RESP: Diffuse wheezing in all lung fields.  No respiratory distress talking full sentences.  GI: Soft, non-tender, non-distended.  MS: Normal ROM in all extremities. No midline spinal tenderness.  SKIN: Warm, dry, no acute rashes.   NEURO: A&Ox3, No focal deficits. Strength 5/5 with no sensory deficits. Steady gait

## 2024-10-17 NOTE — ED ADULT NURSE NOTE - DOES PATIENT HAVE ADVANCE DIRECTIVE
Follow-up Visit         Patient: April Argueta  YOB: 1962  Date of Encounter: 08/07/2023      Chief  Complaint:   Chief Complaint   Patient presents with    Left Shoulder - Follow-up         HPI:  April Argueta, 61 y.o. female presents in follow-up left shoulder pain with previous MRI identifying mild rotator cuff tear of approximately 7 mm with mild arthritis of the acromioclavicular joint and subacromial bursitis.  She was identified to have severe impingement.  She has attended physical therapy and reports significant improvement in her shoulder pain she does acknowledge mild pain with lifting.        Medical History:  Patient Active Problem List   Diagnosis    Nontraumatic incomplete tear of left rotator cuff    Iron deficiency anemia, unspecified     Past Medical History:   Diagnosis Date    Acid reflux     Anxiety     Breast cancer     High cholesterol     Hypertension            Social History:  Social History     Socioeconomic History    Marital status:    Tobacco Use    Smoking status: Never    Smokeless tobacco: Never   Vaping Use    Vaping Use: Never used   Substance and Sexual Activity    Alcohol use: Never    Drug use: Never    Sexual activity: Defer           Current Medications:    Current Outpatient Medications:     cyanocobalamin 1000 MCG/ML injection, , Disp: , Rfl:     dilTIAZem CD (CARDIZEM CD) 240 MG 24 hr capsule, , Disp: , Rfl:     FLUoxetine (PROzac) 40 MG capsule, , Disp: , Rfl:     hydroCHLOROthiazide (HYDRODIURIL) 25 MG tablet, , Disp: , Rfl:     levothyroxine (SYNTHROID, LEVOTHROID) 137 MCG tablet, , Disp: , Rfl:     omeprazole (priLOSEC) 40 MG capsule, , Disp: , Rfl:     vitamin D (ERGOCALCIFEROL) 1.25 MG (44508 UT) capsule capsule, , Disp: , Rfl:     Vraylar 1.5 MG capsule capsule, , Disp: , Rfl:         Allergies:  Allergies   Allergen Reactions    Sulfa Antibiotics Anaphylaxis           Family History:  Family History   Problem Relation Age of Onset     Diabetes Mother     Hypertension Mother     Hypertension Sister     Diabetes Sister     Cancer Sister            Surgical History:  Past Surgical History:   Procedure Laterality Date    CHOLECYSTECTOMY      GASTRIC BYPASS         Orthopedic Examination:   Left shoulder demonstrates full forward elevation with mild signs of impingement good strength with Jobes maneuver and minimal pain.  Full internal and external rotation Speed's Test negative acromioclavicular joint mildly tender with moderate pain upon crossarm adduction neurovascular exam grossly intact.          Assessment & Plan:   61 y.o. female presents follow-up small rotator cuff tear left shoulder she is significantly improved after attending physical therapy she is encouraged to continue with aggressive therapy and to continue this at home. She will return in the future as needed depending on her symptoms.  She may benefit from subacromial decompression in the future.           Diagnosis Plan   1. Nontraumatic incomplete tear of left rotator cuff                    Cc:  Jojo Jung APRN              This document has been electronically signed by Emeterio Colin MD   August 7, 2023 11:10 EDT   No

## 2024-10-17 NOTE — ED PROVIDER NOTE - PROGRESS NOTE DETAILS
ID [id number]    Patient feeling better after albuterol inhaler and steroids.  Given referral for internal medicine and pulmonology.  Will discharge with short course of steroids and albuterol inhaler.  Return precautions discussed.  ID [id number]    Patient feeling better after albuterol inhaler and steroids. CT w/ questionable pneumonia.  Given referral for internal medicine and pulmonology.  Will discharge with short course of steroids and albuterol inhaler and abx. Return precautions discussed.

## 2024-10-17 NOTE — ED PROVIDER NOTE - CARE PLAN
Principal Discharge DX:	Bronchitis with wheezing   1 Principal Discharge DX:	Bronchitis with wheezing  Secondary Diagnosis:	Pneumonia

## 2024-10-17 NOTE — ED ADULT NURSE NOTE - NSFALLUNIVINTERV_ED_ALL_ED
Assistance with ambulation/Communicate risk of Fall with Harm to all staff, patient, and family/Monitor gait and stability/Monitor for mental status changes and reorient to person, place, and time, as needed/Reinforce activity limits and safety measures with patient and family/Use of alarms - bed, stretcher, chair and/or video monitoring/Bed/Stretcher in lowest position, wheels locked, appropriate side rails in place/Call bell, personal items and telephone in reach/Instruct patient to call for assistance before getting out of bed/chair/stretcher/Non-slip footwear applied when patient is off stretcher/[place] to call system/Physically safe environment - no spills, clutter or unnecessary equipment/Purposeful proactive rounding/Room/bathroom lighting operational, light cord in reach

## 2024-10-17 NOTE — ED PROVIDER NOTE - CLINICAL SUMMARY MEDICAL DECISION MAKING FREE TEXT BOX
No distress.  VSS.  Normal work of breathing.  CXR negative.  CT shows possible PNA.  Wheezing improved after meds. DC with Abx, Steroids and Inhaler.  Strict return instructions discussed.  Placed in ED referral program.

## 2024-10-17 NOTE — ED PROVIDER NOTE - NSFOLLOWUPINSTRUCTIONS_ED_ALL_ED_FT
Nuestros coordinadores de referencias del departamento de emergencias se comunicarán con usted en las próximas 24 a  48 horas de 9:00 a. m. a 5:00 p. m. (de lunes a viernes) con frank radha de seguimiento. Espere frank llamada telefónica del hospital en femi período de tiempo. Si no recibe frank llamada o si tiene alguna pregunta o inquietud, puede comunicarse con ellos al [phone]    Sibilancias    LO QUE NECESITA SABER:    ¿Qué necesito saber acerca de las sibilancias?Las sibilancias suceden cuando el aire corre a través de frank vía respiratoria estrecha u obstruida. Las sibilancias pueden suceder cuando usted inhala, exhala o mary carmen ambas acciones. Los silbidos pueden sonar alessandra un osman, chillido, gemido o chirrido. Los silbidos también pueden sonar agudos o armónicos.    ¿Qué causa o aumenta mi riesgo de sibilancias?    Asma    Alergias    Frank infección respiratoria o sinusal    Mucosidad extra en los senos paranasales o en las vías respiratorias    El tabaquismo o la exposición involuntaria al humo del cigarrillo    Ciertas afecciones médicas, alessandra la enfermedad pulmonar obstructiva crónica (EPOC)    Un cuerpo extraño que bloquea las vías respiratorias  ¿Cuáles son los signos y síntomas de las sibilancias?    Respiración ruidosa    Falta de aliento    Opresión en el pecho    Respiración o ritmo cardíaco rápidos    Tos  ¿Cómo se diagnostican las sibilancias?    Frank radiografía o frank tomografía computarizadadel tórax puede mostrar la causa de las sibilancias. Es posible que se le administre un medio de contraste para ayudar a que los pulmones se vean mejor en las imágenes. Informe a martinez médico si alguna vez tuvo frank reacción alérgica a un medio de contraste.    Los análisis de sangrepueden mostrar frank infección, los niveles de oxígeno en kylee y los niveles de anticuerpos.    La prueba de alientopodría mostrar cómo funcionan las vías respiratorias. Las pruebas de función pulmonar (espirometría) o de flujo alexandra pueden mostrar si alicja vías respiratorias están estrechas u obstruidas.    Frank muestra de esputopodría mostrar si las sibilancias son causadas por frank infección bacterial.  ¿Cómo se tratan las sibilancias?    Los medicamentospodrían ayudar a abrir las vías respiratorias, disminuir los síntomas o tratar frank infección. Se pueden administrar en forma de inhalador, nebulizador o píldora    Es posible que usted necesite oxígeno adicionalsi el nivel de oxígeno en martinez kylee está más bajo de lo que debería estar. Es posible que le administren oxígeno a través de frank mascarilla colocada sobre la nariz y la boca o a través de pequeños tubos colocados en las fosas nasales. Pregúntele a martinez médico antes de quitarse la mascarilla o los tubos del oxígeno.  ¿Cómo puedo controlar los síntomas?    Regrese a alicja actividades usuales según indicaciones dadas.Es posible que deba limitar ciertas actividades. Consulte a martinez médico cuándo puede retornar las actividades.    Respire profundo y tosa varias veces al día.[place] disminuirá martinez riesgo de frank infección pulmonar y ayuda a disminuir las sibilancias. Respire profundo y sostenga el aire lo más que pueda. Expulse todo el aire y luego tosa con fuerza. La respiración profunda ayuda a abrir las vías respiratorias. Es posible que le proporcionen un espirómetro de incentivo para ayudarlo a respirar hondo. Coloque la boquilla plástica en la boca y respire lenta y profundamente, a continuación, exhale el aire y tosa. Repita estos pasos 10 veces por hora.    [place] líquidos alessandra se le haya indicado.Es probable que usted necesite andrew más líquidos de lo usual para diluir las flemas y prevenir la deshidratación. Pregunte cuánto líquido debe andrew cada día y cuáles líquidos son los más adecuados para usted.  ¿Cómo puedo prevenir las sibilancias?    No fume.La nicotina y otras sustancias químicas que contienen los cigarrillos y cigarros pueden dañar los pulmones. Pida información a martinez médico si usted actualmente fuma y necesita ayuda para dejar de fumar. Los cigarrillos electrónicos o el tabaco sin humo igualmente contienen nicotina. Consulte con martinez médico antes de utilizar estos productos.    Evite los desencadenantes de alergias, alessandra animales, césped, polen o polvo.  Llame al número de emergencias local (911 en [place]) si:    Usted tiene mareos, falta de aire y dolor en el pecho.    Usted está mareado, confundido o siente que se va a desmayar.    Usted tiene dificultad repentina para respirar.    Martinez garganta se siente alessandra si estuviera inflamada o apretada.  ¿Cuándo stuart buscar atención inmediata?    Usted expectora kylee.    ¿Cuándo stuart llamar a mi médico?    Tiene fiebre.    Alicja sibilancias no mejoran o empeoran.    Usted tiene preguntas o inquietudes acerca de martinez condición o cuidado.  ACUERDOS SOBRE MARTINEZ CUIDADO:    Usted tiene el derecho de ayudar a planear martinez cuidado. Aprenda todo lo que pueda sobre martinez condición y alessandra darle tratamiento. Discuta alicja opciones de tratamiento con alicja médicos para decidir el cuidado que usted desea recibir. Usted siempre tiene el derecho de rechazar el tratamiento. Nuestros coordinadores de referencias del departamento de emergencias se comunicarán con usted en las próximas 24 a  48 horas de 9:00 a. m. a 5:00 p. m. (de lunes a viernes) con frank radha de seguimiento. Espere frank llamada telefónica del hospital en femi período de tiempo. Si no recibe frank llamada o si tiene alguna pregunta o inquietud, puede comunicarse con frankos al [phone]      Neumonía bacterial    LO QUE NECESITA SABER:    La neumonía bacterial es frank infección pulmonar causada por bacterias. Alicja pulmones se inflaman y no pueden funcionar de la manera adecuada. Los gérmenes de la neumonía bacterial se propagan fácilmente cuando frank persona infectada tose o estornuda o por medio de contacto físico cercano.  Los pulmones    MIENTRAS USTED ESTÁ AQUÍ:    Consentimiento informadoes un documento legal que explica los exámenes, tratamientos, o procedimientos que usted podría necesitar. Un consentimiento informado significa que usted comprende que es lo que se va a realizar y que puede andrew decisiones sobre lo que usted desee. Usted da martinez permiso al firmar el formulario de consentimiento. Puede designar a otra persona para que firme nerissa formulario por usted si usted no puede hacerlo. Usted tiene el derecho de comprender martinez cuidado médico en términos o palabras que usted pueda entender. Antes de firmar el documento de consentimiento, entienda los riesgos y beneficios de lo que se le hará. Asegúrese que todas alicja preguntas danielle contestadas.    Frank cánula intravenosaes un tubo pequeño que se coloca en la vena y se usa para administrar medicamentos o líquidos.    Medicamentos:    Los antibióticosayudan a tratar frank infección bacteriana.    El acetaminofeno y el ibuprofenobajan la fiebre y alivian el dolor.    Los esteroidesayudan a reducir la inflamación.  Exámenes:    Un oxímetro de pulsoes un dispositivo que mide la cantidad de oxígeno en martinez kylee. Se coloca un cordón con frank pinza en martinez dedo, oído, o dedo del pie. La otra extremidad del cordón se sujeta a la máquina.  Oxímetro de pulso para pacientes internos      Frank radiografía de tóraxpuede mostrar signos de frank infección en alicja pulmones. También podría mostrar otros problemas, alessandra líquido alrededor de alicja pulmones.    Los análisis de sangrepueden ayudar a alicja médicos a conocer más acerca de martinez neumonía bacteriana.    Frank muestra de mucosidadse colecta y analiza para la presencia de gérmenes que estén causando martinez enfermedad. Le sirve a martinez médico para escoger el mejor medicamento para tratar la infección.  Tratamiento:    Las técnicas para despejar las vías aéreasson ejercicios que ayudan a aflojar la mucosidad para que pueda respirar mejor. Martinez médico le enseñará cómo hacer los ejercicios. Estos ejercicios pueden usarse junto con aparatos o dispositivos que ayudan a disminuir alicja síntomas.    Los tratamientos de respiraciónayudan a abrir alicja vías respiratorias para que usted pueda respirar mejor. Se utiliza frank máquina para transformar el medicamento líquido en vapor. Usted inhalará el vapor a alicja pulmones a través de un tubo y frank pieza bucal. Los medicamentos que se inhalan en forma de vapor actúan rápidamente en las vías aéreas y en los pulmones para aliviar alicja síntomas.    El soporte respiratoriose administra para ayudar con la respiración. Es posible que reciba oxígeno para aumentar el nivel de oxígeno en martinez kylee. También podría necesitar frank máquina que lo ayude a respirar.  RIESGOS:    Es posible que usted presente problemas respiratorios o que la infección se propague a otras áreas de martinez cuerpo. El líquido adicional podría acumularse en el espacio alrededor de alicja pulmones, o alicja pulmones podrían sufrir daño. Es posible que no reciba suficiente oxígeno si alicja pulmones están hinchados o dañados. El nivel bajo de oxígeno puede provocar daño a otros órganos, alessandra a alicja riñones, corazón y cerebro.    ACUERDOS SOBRE MARTINEZ CUIDADO:    Usted tiene el derecho de ayudar a planear martinez cuidado. Aprenda todo lo que pueda sobre martinez condición y alessandra darle tratamiento. Discuta alicja opciones de tratamiento con alicja médicos para decidir el cuidado que usted desea recibir. Usted siempre tiene el derecho de rechazar el tratamiento.    Sibilancias    LO QUE NECESITA SABER:    ¿Qué necesito saber acerca de las sibilancias?Las sibilancias suceden cuando el aire corre a través de frank vía respiratoria estrecha u obstruida. Las sibilancias pueden suceder cuando usted inhala, exhala o mary carmen ambas acciones. Los silbidos pueden sonar alessandra un osman, chillido, gemido o chirrido. Los silbidos también pueden sonar agudos o armónicos.    ¿Qué causa o aumenta mi riesgo de sibilancias?    Asma    Alergias    Frank infección respiratoria o sinusal    Mucosidad extra en los senos paranasales o en las vías respiratorias    El tabaquismo o la exposición involuntaria al humo del cigarrillo    Ciertas afecciones médicas, alessandra la enfermedad pulmonar obstructiva crónica (EPOC)    Un cuerpo extraño que bloquea las vías respiratorias  ¿Cuáles son los signos y síntomas de las sibilancias?    Respiración ruidosa    Falta de aliento    Opresión en el pecho    Respiración o ritmo cardíaco rápidos    Tos  ¿Cómo se diagnostican las sibilancias?    Frank radiografía o frank tomografía computarizadadel tórax puede mostrar la causa de las sibilancias. Es posible que se le administre un medio de contraste para ayudar a que los pulmones se vean mejor en las imágenes. Informe a martinez médico si alguna vez tuvo frank reacción alérgica a un medio de contraste.    Los análisis de sangrepueden mostrar frank infección, los niveles de oxígeno en kylee y los niveles de anticuerpos.    La prueba de alientopodría mostrar cómo funcionan las vías respiratorias. Las pruebas de función pulmonar (espirometría) o de flujo alexandra pueden mostrar si alicja vías respiratorias están estrechas u obstruidas.    Frank muestra de esputopodría mostrar si las sibilancias son causadas por frank infección bacterial.  ¿Cómo se tratan las sibilancias?    Los medicamentospodrían ayudar a abrir las vías respiratorias, disminuir los síntomas o tratar frank infección. Se pueden administrar en forma de inhalador, nebulizador o píldora    Es posible que usted necesite oxígeno adicionalsi el nivel de oxígeno en martinez kylee está más bajo de lo que debería estar. Es posible que le administren oxígeno a través de frank mascarilla colocada sobre la nariz y la boca o a través de pequeños tubos colocados en las fosas nasales. Pregúntele a martinez médico antes de quitarse la mascarilla o los tubos del oxígeno.  ¿Cómo puedo controlar los síntomas?    Regrese a alicja actividades usuales según indicaciones dadas.Es posible que deba limitar ciertas actividades. Consulte a martinez médico cuándo puede retornar las actividades.    Respire profundo y tosa varias veces al día.[place] disminuirá martinez riesgo de farnk infección pulmonar y ayuda a disminuir las sibilancias. Respire profundo y sostenga el aire lo más que pueda. Expulse todo el aire y luego tosa con fuerza. La respiración profunda ayuda a abrir las vías respiratorias. Es posible que le proporcionen un espirómetro de incentivo para ayudarlo a respirar hondo. Coloque la boquilla plástica en la boca y respire lenta y profundamente, a continuación, exhale el aire y tosa. Repita estos pasos 10 veces por hora.    [place] líquidos alessandra se le haya indicado.Es probable que usted necesite andrew más líquidos de lo usual para diluir las flemas y prevenir la deshidratación. Pregunte cuánto líquido debe andrew cada día y cuáles líquidos son los más adecuados para usted.  ¿Cómo puedo prevenir las sibilancias?    No fume.La nicotina y otras sustancias químicas que contienen los cigarrillos y cigarros pueden dañar los pulmones. Pida información a martinez médico si usted actualmente fuma y necesita ayuda para dejar de fumar. Los cigarrillos electrónicos o el tabaco sin humo igualmente contienen nicotina. Consulte con martinez médico antes de utilizar estos productos.    Evite los desencadenantes de alergias, alessandra animales, césped, polen o polvo.  Llame al número de emergencias local (911 en [place]) si:    Usted tiene mareos, falta de aire y dolor en el pecho.    Usted está mareado, confundido o siente que se va a desmayar.    Usted tiene dificultad repentina para respirar.    Martinze garganta se siente alessandra si estuviera inflamada o apretada.  ¿Cuándo stuart buscar atención inmediata?    Usted expectora kylee.    ¿Cuándo stuart llamar a mi médico?    Tiene fiebre.    Alicja sibilancias no mejoran o empeoran.    Usted tiene preguntas o inquietudes acerca de martinez condición o cuidado.  ACUERDOS SOBRE MARTINEZ CUIDADO:    Usted tiene el derecho de ayudar a planear martinez cuidado. Aprenda todo lo que pueda sobre martinez condición y alessandra darle tratamiento. Discuta alicja opciones de tratamiento con alicja médicos para decidir el cuidado que usted desea recibir. Usted siempre tiene el derecho de rechazar el tratamiento.

## 2024-10-17 NOTE — ED PROVIDER NOTE - NSFOLLOWUPCLINICS_GEN_ALL_ED_FT
[facility]  Medicine  [address], [place]   Phone: [phone]  Fax:   Follow Up Time: Routine

## 2024-10-18 VITALS
RESPIRATION RATE: 18 BRPM | TEMPERATURE: 98 F | HEART RATE: 88 BPM | OXYGEN SATURATION: 100 % | DIASTOLIC BLOOD PRESSURE: 78 MMHG | SYSTOLIC BLOOD PRESSURE: 111 MMHG

## 2024-10-18 RX ADMIN — IPRATROPIUM BROMIDE AND ALBUTEROL SULFATE 3 MILLILITER(S): .5; 3 SOLUTION RESPIRATORY (INHALATION) at 00:13

## 2024-10-30 ENCOUNTER — OUTPATIENT (OUTPATIENT)
Dept: OUTPATIENT SERVICES | Facility: HOSPITAL | Age: 49
LOS: 1 days | End: 2024-10-30
Payer: COMMERCIAL

## 2024-10-30 ENCOUNTER — APPOINTMENT (OUTPATIENT)
Dept: INTERNAL MEDICINE | Facility: CLINIC | Age: 49
End: 2024-10-30
Payer: COMMERCIAL

## 2024-10-30 VITALS
BODY MASS INDEX: 30.35 KG/M2 | OXYGEN SATURATION: 100 % | SYSTOLIC BLOOD PRESSURE: 105 MMHG | TEMPERATURE: 97.4 F | DIASTOLIC BLOOD PRESSURE: 68 MMHG | HEIGHT: 55.5 IN | HEART RATE: 79 BPM | WEIGHT: 133 LBS

## 2024-10-30 DIAGNOSIS — Z00.00 ENCOUNTER FOR GENERAL ADULT MEDICAL EXAMINATION WITHOUT ABNORMAL FINDINGS: ICD-10-CM

## 2024-10-30 DIAGNOSIS — Z12.11 ENCOUNTER FOR SCREENING FOR MALIGNANT NEOPLASM OF COLON: ICD-10-CM

## 2024-10-30 DIAGNOSIS — J40 BRONCHITIS, NOT SPECIFIED AS ACUTE OR CHRONIC: ICD-10-CM

## 2024-10-30 DIAGNOSIS — Z12.39 ENCOUNTER FOR OTHER SCREENING FOR MALIGNANT NEOPLASM OF BREAST: ICD-10-CM

## 2024-10-30 PROCEDURE — 99203 OFFICE O/P NEW LOW 30 MIN: CPT

## 2024-10-31 DIAGNOSIS — Z12.11 ENCOUNTER FOR SCREENING FOR MALIGNANT NEOPLASM OF COLON: ICD-10-CM

## 2024-10-31 DIAGNOSIS — Z12.39 ENCOUNTER FOR OTHER SCREENING FOR MALIGNANT NEOPLASM OF BREAST: ICD-10-CM

## 2024-10-31 DIAGNOSIS — J40 BRONCHITIS, NOT SPECIFIED AS ACUTE OR CHRONIC: ICD-10-CM

## 2024-12-17 ENCOUNTER — APPOINTMENT (OUTPATIENT)
Dept: PULMONOLOGY | Facility: CLINIC | Age: 49
End: 2024-12-17

## 2024-12-17 ENCOUNTER — EMERGENCY (EMERGENCY)
Facility: HOSPITAL | Age: 49
LOS: 0 days | Discharge: HOME | End: 2024-12-17
Attending: EMERGENCY MEDICINE
Payer: MEDICAID

## 2024-12-17 VITALS
OXYGEN SATURATION: 99 % | HEART RATE: 77 BPM | DIASTOLIC BLOOD PRESSURE: 74 MMHG | TEMPERATURE: 98 F | SYSTOLIC BLOOD PRESSURE: 117 MMHG | RESPIRATION RATE: 18 BRPM

## 2024-12-17 DIAGNOSIS — L29.9 PRURITUS, UNSPECIFIED: ICD-10-CM

## 2024-12-17 DIAGNOSIS — H01.004 UNSPECIFIED BLEPHARITIS LEFT UPPER EYELID: ICD-10-CM

## 2024-12-17 DIAGNOSIS — H02.844 EDEMA OF LEFT UPPER EYELID: ICD-10-CM

## 2024-12-17 PROCEDURE — 99283 EMERGENCY DEPT VISIT LOW MDM: CPT

## 2024-12-17 PROCEDURE — T1013: CPT

## 2024-12-17 RX ORDER — FLUORESCEIN SODIUM 100 %
1 POWDER (GRAM) MISCELLANEOUS ONCE
Refills: 0 | Status: DISCONTINUED | OUTPATIENT
Start: 2024-12-17 | End: 2024-12-17

## 2024-12-17 RX ORDER — BACITRACIN ZINC 500 UNIT/G
1 OINTMENT (GRAM) TOPICAL
Qty: 1 | Refills: 0
Start: 2024-12-17 | End: 2024-12-23

## 2024-12-17 NOTE — ED PROVIDER NOTE - CLINICAL SUMMARY MEDICAL DECISION MAKING FREE TEXT BOX
49-year-old female presents to the ED for left eye lid irritation.  Started for the last 3 days.  Mildly swollen.  No change in vision.  No trauma noted.  No mascara or fake eyelashes.  Physical exam noted to have of left upper eyelid swelling consistent with blepharitis.  Fluorescein exam negative for corneal abrasions.  Bacitracin to the pharmacy.  Discharged home.  Return precautions given.

## 2024-12-17 NOTE — ED PROVIDER NOTE - OBJECTIVE STATEMENT
50 y/o female, with no significant PMH, presents to the ED with L upper eyelid swelling for 3 days. Associated with itchiness. Denies eye pain, fever, chills, sore throat, rhinorrhea, trauma.

## 2024-12-17 NOTE — ED PROVIDER NOTE - PHYSICAL EXAMINATION
GENERAL: Well-developed; well-nourished; in no acute distress.   SKIN: warm, dry  HEAD: Normocephalic; atraumatic.  EYES: 2 mm pupils, PERRLA, EOMI and nontender, no conjunctival erythema. L upper eyelid mild edema and slight erythema. No fluorescein uptake. Vision intact.   ENT: No nasal discharge; airway clear.  NECK: Supple; non tender.  NEURO: A/ox3, grossly unremarkable

## 2024-12-17 NOTE — ED PROVIDER NOTE - CONDITION AT DISCHARGE:
Patient states she had an ablation 5/22.  Since she had the procedure she has had pain across her LUQ and RUQ.  Patient was following up with her physicians who stated this was gas pain and asked her to take gas-x.  Patient complied with no relief of symptoms.  States she also tried baking soda with water but after that pain became so severe she was on the floor.  
Improved

## 2024-12-17 NOTE — ED PROVIDER NOTE - PATIENT PORTAL LINK FT
You can access the FollowMyHealth Patient Portal offered by [facility] by registering at the following website: [email or web address]. By joining [employer]’s FollowMyHealth portal, you will also be able to view your health information using other applications (apps) compatible with our system.

## 2024-12-17 NOTE — ED PROVIDER NOTE - NSFOLLOWUPINSTRUCTIONS_ED_ALL_ED_FT
Educación para el paciente: Blefaritis (Conceptos Básicos)  Redactado por los médicos y editores de [employer]  Shari el Descargo de responsabilidad al final de esta página.    ¿Qué es la blefaritis?    La blefaritis es la inflamación de los párpados. Puede hacer que los párpados se hinchen, y que la piel de los párpados se sukhi cristian u oscura. Los síntomas podrían mejorar y luego regresar, sylvie es muy poco frecuente que la blefaritis cause problemas de visión.    La blefaritis es más común en personas que tienen ciertos padecimientos en la piel, entre los que se encuentran:    ?Rosácea – Provoca ronchas en las mejillas, la nariz, el mentón, la frente o los párpados. En las personas de piel más dex, las ronchas y la piel de la juan suelen tener un aspecto go.    ?Dermatitis seborreica – Causa zonas escamosas en la piel y en algunos casos comezón. Suele afectar partes de la piel con muchas glándulas sebáceas. La caspa es frank forma leve de seborrea. En las personas de piel más dex, la piel podría verse cristian. En las personas de piel más oscura, la piel podría verse de color marrón oscuro o púrpura.    ¿Cuáles son los síntomas de la blefaritis?    Los síntomas son, entre otros:    ?Piel de los párpados de aspecto go u oscuro    ?Párpados inflamados y con comezón    ?Sensación de ardor o sequedad en los ojos    ?Ojos enrojecidos    ?Pestañas con costras o pegoteadas en la mañana    ?Piel escamosa en los párpados (imagen 1)    ¿Existe alguna prueba para detectar la blefaritis?    No. No existe ninguna prueba. Sin embargo, martinez médico o enfermero debería poder determinar si tiene el padecimiento al preguntarle sobre brandon síntomas y hacerle un examen.    ¿Hay algo que pueda hacer por mi cuenta para sentirme mejor?    Sí. Puede hacer lo siguiente:    ?Aplicar presión tibia y húmeda sobre los ojos – Moje un paño limpio con agua tibia (no caliente) y colóqueselo sobre los ojos. Cuando el paño se enfríe, vuelva a calentarlo con agua tibia y colóquelo de nuevo sobre los ojos. Repita estos pasos mary carmen 5 minutos, 2 a 4 veces al día.    ?Masajear suavemente los párpados – [place]galo inmediatamente después de aplicar presión tibia y húmeda sobre los ojos. Use el paño o la punta de un dedo limpio para masajear suavemente el párpado formando pequeños círculos.    ?Aubrey los párpados – Use agua tibia thao o agua tibia con frank gota de champú para bebés en un paño limpio, frank gasa o un hisopo. Limpie suavemente cualquier costra de las pestañas y los párpados. No frote con fuerza porque podría empeorar la irritación. También puede usar paños o almohadillas de limpieza de venta sin receta.    ¿Cómo se trata la blefaritis?    Si los tratamientos que hace por martinez cuenta no le ayudan, el médico podría indicarle:    ?Frank crema o ungüento con antibiótico que se aplica sobre los párpados    ?Píldoras de antibiótico

## 2025-03-12 ENCOUNTER — APPOINTMENT (OUTPATIENT)
Dept: INTERNAL MEDICINE | Facility: CLINIC | Age: 50
End: 2025-03-12

## 2025-04-04 NOTE — CHART NOTE - NSCHARTNOTESELECT_GEN_ALL_CORE
Patient did a CT abdomen and pelvis on October 4, 2023 which showed questionable left-sided colonic wall thickening that could be colitis.  No acute intra-abdominal or intrapelvic process seen.  Small hiatal hernia.  Sigmoid diverticulosis seen and no diverticulitis.   October 4, 2023 labs showed WBC 6.63 hemoglobin 13.3 MCV 98.5 platelet 184.  Neutrophils 5.4 lymphocytes 0.9.  Sodium 140 potassium 4.0 chloride 103 CO2 25 glucose elevated 185 BUN 14.  0.9 calcium 9.8 total protein 7.3 albumin 4.1 AST 28 ALT 38 alk phos 87 bilirubin 0.4.   They concluded that she had colitis and gave her Cipro, dicyclomine, metronidazole, and Zofran.
Non-Clinical Appointment Info/Event Note

## 2025-05-02 ENCOUNTER — APPOINTMENT (OUTPATIENT)
Dept: PULMONOLOGY | Facility: CLINIC | Age: 50
End: 2025-05-02